# Patient Record
Sex: MALE | Race: BLACK OR AFRICAN AMERICAN | Employment: FULL TIME | URBAN - METROPOLITAN AREA
[De-identification: names, ages, dates, MRNs, and addresses within clinical notes are randomized per-mention and may not be internally consistent; named-entity substitution may affect disease eponyms.]

---

## 2019-10-11 ENCOUNTER — HOSPITAL ENCOUNTER (OUTPATIENT)
Dept: MRI IMAGING | Age: 44
Discharge: HOME | End: 2019-10-11

## 2019-10-11 NOTE — PROGRESS NOTES
Pt told to come to [facility] today. Pt states he didn't know why  Or what procedure he was having done. Pt states he has attempted MRI with what sounds like Xanax 1mg po 1.5 hours prior to exam at the [place]. He states he never felt the medication. Then they were going to do anesthesia but they told him his shoulers wouldn't fit in the scanner at the [place]. Pt states he can't know what is going on because of his\" paranoia. \" Pt r/s to [facility] with anesthesia. Explained they use propofol. But, our anesthesia is a general anesthetic with intubation. He is fine with waiting for next available since this has been going on for a year.

## 2019-12-25 ENCOUNTER — HOSPITAL ENCOUNTER (EMERGENCY)
Dept: HOSPITAL 74 - JERFT | Age: 44
Discharge: HOME | End: 2019-12-25
Payer: COMMERCIAL

## 2019-12-25 VITALS — HEART RATE: 82 BPM | SYSTOLIC BLOOD PRESSURE: 118 MMHG | DIASTOLIC BLOOD PRESSURE: 82 MMHG | TEMPERATURE: 97.1 F

## 2019-12-25 VITALS — BODY MASS INDEX: 30.2 KG/M2
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
## 2019-12-25 DIAGNOSIS — S61.512A: Primary | ICD-10-CM
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
## 2019-12-25 PROCEDURE — 0JQH0ZZ REPAIR LEFT LOWER ARM SUBCUTANEOUS TISSUE AND FASCIA, OPEN APPROACH: ICD-10-PCS
